# Patient Record
Sex: MALE | Race: WHITE
[De-identification: names, ages, dates, MRNs, and addresses within clinical notes are randomized per-mention and may not be internally consistent; named-entity substitution may affect disease eponyms.]

---

## 2019-08-26 ENCOUNTER — HOSPITAL ENCOUNTER (OUTPATIENT)
Dept: HOSPITAL 53 - M WUC | Age: 29
End: 2019-08-26
Attending: PHYSICIAN ASSISTANT
Payer: COMMERCIAL

## 2019-08-26 DIAGNOSIS — M79.671: Primary | ICD-10-CM

## 2019-08-27 NOTE — REP
Clinical:  Lateral pain

 

Technique:  AP, lateral, bilateral oblique views right foot .

 

Findings:  The osseous structures and joint spaces are intact and normal.  There

is no evidence for acute fracture or dislocation.  Surrounding soft tissues are

unremarkable.  No subcutaneous emphysema or radiodense foreign body.

 

Impression:

Age-appropriate right foot series .  No acute fracture or dislocation.

 

 

Electronically Signed by

Mike Acosta MD 08/27/2019 02:41 A